# Patient Record
Sex: FEMALE | URBAN - METROPOLITAN AREA
[De-identification: names, ages, dates, MRNs, and addresses within clinical notes are randomized per-mention and may not be internally consistent; named-entity substitution may affect disease eponyms.]

---

## 2020-07-09 ENCOUNTER — APPOINTMENT (RX ONLY)
Dept: URBAN - METROPOLITAN AREA CLINIC 36 | Facility: CLINIC | Age: 55
Setting detail: DERMATOLOGY
End: 2020-07-09

## 2020-07-09 DIAGNOSIS — Z41.9 ENCOUNTER FOR PROCEDURE FOR PURPOSES OTHER THAN REMEDYING HEALTH STATE, UNSPECIFIED: ICD-10-CM

## 2020-07-09 PROCEDURE — ? JUVEDERM ULTRA PLUS XC INJECTION

## 2020-07-09 PROCEDURE — ? BOTOX

## 2020-07-09 NOTE — PROCEDURE: BOTOX
Depressor Anguli Oris Units: 6
Show Additional Area 5: Yes
Right Periorbital Skin Units: 0
Price (Use Numbers Only, No Special Characters Or $): 0.00
Dilution (U/0.1 Cc): 0.6
Forehead Units: 10
Show Right And Left Brow Units: No
Additional Area 1 Location: upper lip
Consent: Written consent obtained. Risks include but not limited to lid/brow ptosis, bruising, swelling, diplopia, temporary effect, incomplete chemical denervation.
Glabellar Complex Units: 24
Additional Area 3 Location: masseter
Additional Area 1 Units: 4
Detail Level: Detailed
Post-Care Instructions: Patient instructed to not lie down for 4 hours and limit physical activity for 24 hours. Patient instructed not to travel by airplane for 48 hours.
Expiration Date (Month Year): 08/22
Lot #: [id number]
Additional Area 2 Location: chin

## 2020-07-09 NOTE — PROCEDURE: JUVEDERM ULTRA PLUS XC INJECTION
Procedural Text: The filler was administered to the treatment areas noted above.
Filler: Juvederm Ultra Plus XC
Tear Troughs Filler Volume In Cc: 0
Lot #: [id number]
Map Statment: See Attach Map for Complete Details
Include Cannula Information In Note?: No
Additional Anesthesia Volume In Cc: 6
Consent: Written consent obtained. Risks include but not limited to bruising, beading, irregular texture, ulceration, infection, allergic reaction, scar formation, incomplete augmentation, temporary nature, procedural pain.
Detail Level: Detailed
Number Of Syringes (Required For Inventory): 1
Expiration Date (Month Year): 08/21/2020
Post-Care Instructions: Patient instructed to apply ice to reduce swelling.
Anesthesia Type: 1% lidocaine with epinephrine

## 2020-09-09 ENCOUNTER — APPOINTMENT (RX ONLY)
Dept: URBAN - METROPOLITAN AREA CLINIC 20 | Facility: CLINIC | Age: 55
Setting detail: DERMATOLOGY
End: 2020-09-09

## 2020-09-09 DIAGNOSIS — Z71.89 OTHER SPECIFIED COUNSELING: ICD-10-CM

## 2020-09-09 DIAGNOSIS — D49.2 NEOPLASM OF UNSPECIFIED BEHAVIOR OF BONE, SOFT TISSUE, AND SKIN: ICD-10-CM

## 2020-09-09 DIAGNOSIS — D18.0 HEMANGIOMA: ICD-10-CM

## 2020-09-09 DIAGNOSIS — D22 MELANOCYTIC NEVI: ICD-10-CM

## 2020-09-09 DIAGNOSIS — L81.4 OTHER MELANIN HYPERPIGMENTATION: ICD-10-CM

## 2020-09-09 DIAGNOSIS — L82.1 OTHER SEBORRHEIC KERATOSIS: ICD-10-CM

## 2020-09-09 PROBLEM — D22.5 MELANOCYTIC NEVI OF TRUNK: Status: ACTIVE | Noted: 2020-09-09

## 2020-09-09 PROBLEM — D22.61 MELANOCYTIC NEVI OF RIGHT UPPER LIMB, INCLUDING SHOULDER: Status: ACTIVE | Noted: 2020-09-09

## 2020-09-09 PROBLEM — D22.72 MELANOCYTIC NEVI OF LEFT LOWER LIMB, INCLUDING HIP: Status: ACTIVE | Noted: 2020-09-09

## 2020-09-09 PROBLEM — D18.01 HEMANGIOMA OF SKIN AND SUBCUTANEOUS TISSUE: Status: ACTIVE | Noted: 2020-09-09

## 2020-09-09 PROBLEM — D22.62 MELANOCYTIC NEVI OF LEFT UPPER LIMB, INCLUDING SHOULDER: Status: ACTIVE | Noted: 2020-09-09

## 2020-09-09 PROBLEM — D22.71 MELANOCYTIC NEVI OF RIGHT LOWER LIMB, INCLUDING HIP: Status: ACTIVE | Noted: 2020-09-09

## 2020-09-09 PROCEDURE — ? DEFER

## 2020-09-09 PROCEDURE — ? OBSERVATION AND MEASURE

## 2020-09-09 PROCEDURE — 99203 OFFICE O/P NEW LOW 30 MIN: CPT

## 2020-09-09 PROCEDURE — ? ADDITIONAL NOTES

## 2020-09-09 PROCEDURE — ? COUNSELING

## 2020-09-09 ASSESSMENT — LOCATION DETAILED DESCRIPTION DERM
LOCATION DETAILED: RIGHT ANTECUBITAL SKIN
LOCATION DETAILED: RIGHT MEDIAL DORSAL FOOT
LOCATION DETAILED: LEFT ANTERIOR PROXIMAL UPPER ARM
LOCATION DETAILED: LEFT PROXIMAL PRETIBIAL REGION
LOCATION DETAILED: RIGHT KNEE
LOCATION DETAILED: RIGHT ANTERIOR DISTAL UPPER ARM
LOCATION DETAILED: EPIGASTRIC SKIN
LOCATION DETAILED: LEFT ANTERIOR DISTAL THIGH
LOCATION DETAILED: RIGHT VENTRAL PROXIMAL FOREARM
LOCATION DETAILED: LEFT KNEE
LOCATION DETAILED: RIGHT ANTERIOR DISTAL THIGH
LOCATION DETAILED: RIGHT ANTERIOR PROXIMAL UPPER ARM
LOCATION DETAILED: LEFT VENTRAL PROXIMAL FOREARM
LOCATION DETAILED: PERIUMBILICAL SKIN
LOCATION DETAILED: RIGHT PROXIMAL PRETIBIAL REGION
LOCATION DETAILED: LOWER STERNUM
LOCATION DETAILED: LEFT ANTERIOR DISTAL UPPER ARM

## 2020-09-09 ASSESSMENT — LOCATION SIMPLE DESCRIPTION DERM
LOCATION SIMPLE: ABDOMEN
LOCATION SIMPLE: RIGHT KNEE
LOCATION SIMPLE: LEFT UPPER ARM
LOCATION SIMPLE: RIGHT UPPER ARM
LOCATION SIMPLE: LEFT PRETIBIAL REGION
LOCATION SIMPLE: CHEST
LOCATION SIMPLE: RIGHT FOREARM
LOCATION SIMPLE: LEFT FOREARM
LOCATION SIMPLE: LEFT KNEE
LOCATION SIMPLE: LEFT THIGH
LOCATION SIMPLE: RIGHT FOOT
LOCATION SIMPLE: RIGHT PRETIBIAL REGION
LOCATION SIMPLE: RIGHT THIGH

## 2020-09-09 ASSESSMENT — LOCATION ZONE DERM
LOCATION ZONE: LEG
LOCATION ZONE: ARM
LOCATION ZONE: FEET
LOCATION ZONE: TRUNK

## 2020-09-09 NOTE — PROCEDURE: ADDITIONAL NOTES
Additional Notes: Favor corn/callus\\nPt denies any rubbing to area. Does not have characteristic features of a wart. \\nDiscussed paring down vs biopsy.\\nPatient would like to wait and observe area\\nPhoto taken
Detail Level: Simple

## 2023-05-10 ENCOUNTER — HOSPITAL ENCOUNTER (EMERGENCY)
Facility: MEDICAL CENTER | Age: 58
End: 2023-05-11
Attending: STUDENT IN AN ORGANIZED HEALTH CARE EDUCATION/TRAINING PROGRAM
Payer: COMMERCIAL

## 2023-05-10 ENCOUNTER — APPOINTMENT (OUTPATIENT)
Dept: RADIOLOGY | Facility: MEDICAL CENTER | Age: 58
End: 2023-05-10
Payer: COMMERCIAL

## 2023-05-10 ENCOUNTER — APPOINTMENT (OUTPATIENT)
Dept: RADIOLOGY | Facility: MEDICAL CENTER | Age: 58
End: 2023-05-10
Attending: STUDENT IN AN ORGANIZED HEALTH CARE EDUCATION/TRAINING PROGRAM
Payer: COMMERCIAL

## 2023-05-10 VITALS
SYSTOLIC BLOOD PRESSURE: 110 MMHG | DIASTOLIC BLOOD PRESSURE: 65 MMHG | OXYGEN SATURATION: 96 % | HEIGHT: 61 IN | WEIGHT: 109.35 LBS | BODY MASS INDEX: 20.65 KG/M2 | RESPIRATION RATE: 18 BRPM | TEMPERATURE: 99.3 F | HEART RATE: 73 BPM

## 2023-05-10 DIAGNOSIS — K57.92 ACUTE DIVERTICULITIS: ICD-10-CM

## 2023-05-10 DIAGNOSIS — R10.32 LEFT LOWER QUADRANT ABDOMINAL PAIN: ICD-10-CM

## 2023-05-10 LAB
ALBUMIN SERPL BCP-MCNC: 4.4 G/DL (ref 3.2–4.9)
ALBUMIN/GLOB SERPL: 1.2 G/DL
ALP SERPL-CCNC: 73 U/L (ref 30–99)
ALT SERPL-CCNC: 18 U/L (ref 2–50)
ANION GAP SERPL CALC-SCNC: 12 MMOL/L (ref 7–16)
AST SERPL-CCNC: 20 U/L (ref 12–45)
BASOPHILS # BLD AUTO: 0.2 % (ref 0–1.8)
BASOPHILS # BLD: 0.03 K/UL (ref 0–0.12)
BILIRUB SERPL-MCNC: 0.5 MG/DL (ref 0.1–1.5)
BUN SERPL-MCNC: 14 MG/DL (ref 8–22)
CALCIUM ALBUM COR SERPL-MCNC: 9.3 MG/DL (ref 8.5–10.5)
CALCIUM SERPL-MCNC: 9.6 MG/DL (ref 8.5–10.5)
CHLORIDE SERPL-SCNC: 102 MMOL/L (ref 96–112)
CO2 SERPL-SCNC: 24 MMOL/L (ref 20–33)
CREAT SERPL-MCNC: 0.79 MG/DL (ref 0.5–1.4)
EOSINOPHIL # BLD AUTO: 0.03 K/UL (ref 0–0.51)
EOSINOPHIL NFR BLD: 0.2 % (ref 0–6.9)
ERYTHROCYTE [DISTWIDTH] IN BLOOD BY AUTOMATED COUNT: 47.8 FL (ref 35.9–50)
GFR SERPLBLD CREATININE-BSD FMLA CKD-EPI: 88 ML/MIN/1.73 M 2
GLOBULIN SER CALC-MCNC: 3.7 G/DL (ref 1.9–3.5)
GLUCOSE SERPL-MCNC: 141 MG/DL (ref 65–99)
HCT VFR BLD AUTO: 39.7 % (ref 37–47)
HGB BLD-MCNC: 13.3 G/DL (ref 12–16)
IMM GRANULOCYTES # BLD AUTO: 0.07 K/UL (ref 0–0.11)
IMM GRANULOCYTES NFR BLD AUTO: 0.6 % (ref 0–0.9)
LACTATE SERPL-SCNC: 0.8 MMOL/L (ref 0.5–2)
LYMPHOCYTES # BLD AUTO: 1.27 K/UL (ref 1–4.8)
LYMPHOCYTES NFR BLD: 10 % (ref 22–41)
MCH RBC QN AUTO: 31.5 PG (ref 27–33)
MCHC RBC AUTO-ENTMCNC: 33.5 G/DL (ref 33.6–35)
MCV RBC AUTO: 94.1 FL (ref 81.4–97.8)
MONOCYTES # BLD AUTO: 0.82 K/UL (ref 0–0.85)
MONOCYTES NFR BLD AUTO: 6.5 % (ref 0–13.4)
NEUTROPHILS # BLD AUTO: 10.45 K/UL (ref 2–7.15)
NEUTROPHILS NFR BLD: 82.5 % (ref 44–72)
NRBC # BLD AUTO: 0 K/UL
NRBC BLD-RTO: 0 /100 WBC
PLATELET # BLD AUTO: 217 K/UL (ref 164–446)
PMV BLD AUTO: 11.1 FL (ref 9–12.9)
POTASSIUM SERPL-SCNC: 3.7 MMOL/L (ref 3.6–5.5)
PROT SERPL-MCNC: 8.1 G/DL (ref 6–8.2)
RBC # BLD AUTO: 4.22 M/UL (ref 4.2–5.4)
SODIUM SERPL-SCNC: 138 MMOL/L (ref 135–145)
WBC # BLD AUTO: 12.7 K/UL (ref 4.8–10.8)

## 2023-05-10 PROCEDURE — 700117 HCHG RX CONTRAST REV CODE 255: Performed by: STUDENT IN AN ORGANIZED HEALTH CARE EDUCATION/TRAINING PROGRAM

## 2023-05-10 PROCEDURE — 81001 URINALYSIS AUTO W/SCOPE: CPT

## 2023-05-10 PROCEDURE — 87186 SC STD MICRODIL/AGAR DIL: CPT

## 2023-05-10 PROCEDURE — 80053 COMPREHEN METABOLIC PANEL: CPT

## 2023-05-10 PROCEDURE — A9270 NON-COVERED ITEM OR SERVICE: HCPCS

## 2023-05-10 PROCEDURE — 87086 URINE CULTURE/COLONY COUNT: CPT

## 2023-05-10 PROCEDURE — 87040 BLOOD CULTURE FOR BACTERIA: CPT | Mod: 91

## 2023-05-10 PROCEDURE — 87077 CULTURE AEROBIC IDENTIFY: CPT

## 2023-05-10 PROCEDURE — 74177 CT ABD & PELVIS W/CONTRAST: CPT

## 2023-05-10 PROCEDURE — 99284 EMERGENCY DEPT VISIT MOD MDM: CPT

## 2023-05-10 PROCEDURE — 83605 ASSAY OF LACTIC ACID: CPT

## 2023-05-10 PROCEDURE — 700102 HCHG RX REV CODE 250 W/ 637 OVERRIDE(OP): Performed by: STUDENT IN AN ORGANIZED HEALTH CARE EDUCATION/TRAINING PROGRAM

## 2023-05-10 PROCEDURE — 700102 HCHG RX REV CODE 250 W/ 637 OVERRIDE(OP)

## 2023-05-10 PROCEDURE — 700105 HCHG RX REV CODE 258: Performed by: STUDENT IN AN ORGANIZED HEALTH CARE EDUCATION/TRAINING PROGRAM

## 2023-05-10 PROCEDURE — 85025 COMPLETE CBC W/AUTO DIFF WBC: CPT

## 2023-05-10 PROCEDURE — 36415 COLL VENOUS BLD VENIPUNCTURE: CPT

## 2023-05-10 PROCEDURE — A9270 NON-COVERED ITEM OR SERVICE: HCPCS | Performed by: STUDENT IN AN ORGANIZED HEALTH CARE EDUCATION/TRAINING PROGRAM

## 2023-05-10 RX ORDER — METRONIDAZOLE 500 MG/1
500 TABLET ORAL ONCE
Status: COMPLETED | OUTPATIENT
Start: 2023-05-11 | End: 2023-05-10

## 2023-05-10 RX ORDER — CIPROFLOXACIN 500 MG/1
500 TABLET, FILM COATED ORAL 2 TIMES DAILY
Qty: 14 TABLET | Refills: 0 | Status: ACTIVE | OUTPATIENT
Start: 2023-05-10 | End: 2023-05-17

## 2023-05-10 RX ORDER — ACETAMINOPHEN 325 MG/1
650 TABLET ORAL ONCE
Status: COMPLETED | OUTPATIENT
Start: 2023-05-10 | End: 2023-05-10

## 2023-05-10 RX ORDER — CIPROFLOXACIN 500 MG/1
500 TABLET, FILM COATED ORAL ONCE
Status: COMPLETED | OUTPATIENT
Start: 2023-05-11 | End: 2023-05-10

## 2023-05-10 RX ORDER — SODIUM CHLORIDE, SODIUM LACTATE, POTASSIUM CHLORIDE, AND CALCIUM CHLORIDE .6; .31; .03; .02 G/100ML; G/100ML; G/100ML; G/100ML
30 INJECTION, SOLUTION INTRAVENOUS ONCE
Status: COMPLETED | OUTPATIENT
Start: 2023-05-10 | End: 2023-05-10

## 2023-05-10 RX ORDER — METRONIDAZOLE 500 MG/1
500 TABLET ORAL 3 TIMES DAILY
Qty: 21 TABLET | Refills: 0 | Status: ACTIVE | OUTPATIENT
Start: 2023-05-10 | End: 2023-05-17

## 2023-05-10 RX ADMIN — METRONIDAZOLE 500 MG: 500 TABLET ORAL at 23:50

## 2023-05-10 RX ADMIN — IOHEXOL 80 ML: 350 INJECTION, SOLUTION INTRAVENOUS at 23:00

## 2023-05-10 RX ADMIN — ACETAMINOPHEN 650 MG: 325 TABLET, FILM COATED ORAL at 21:08

## 2023-05-10 RX ADMIN — CIPROFLOXACIN 500 MG: 500 TABLET, FILM COATED ORAL at 23:50

## 2023-05-10 RX ADMIN — SODIUM CHLORIDE, POTASSIUM CHLORIDE, SODIUM LACTATE AND CALCIUM CHLORIDE 1488 ML: 600; 310; 30; 20 INJECTION, SOLUTION INTRAVENOUS at 22:24

## 2023-05-10 ASSESSMENT — PAIN DESCRIPTION - DESCRIPTORS: DESCRIPTORS: SHARP

## 2023-05-10 ASSESSMENT — PAIN DESCRIPTION - PAIN TYPE
TYPE: ACUTE PAIN
TYPE: ACUTE PAIN

## 2023-05-11 NOTE — DISCHARGE INSTRUCTIONS
Please return to the ER immediately if you have persistent worsening pain, fever, vomiting, inability to eat or drink, blood or black/tarry stools or any new or acute concern.     Please call your PCP to schedule follow-up within one week to ensure your symptoms are improving.

## 2023-05-11 NOTE — ED TRIAGE NOTES
"Chief Complaint   Patient presents with    Abdominal Pain     Started Sunday night, pt reports fever last night of 102F and it broke last night, and reports this morning started having fever again. Pt reports taking tylenol at home that helped with the fever. Abdominal pain is more diffuse, pt states it is painful to have a bowel movement when bearing down. Denies N/V, pt is able to keep food down. Denies diarrhea. +rebound tenderness. Reports pain is sharp in nature.     Fever       57F to triage for above complaint. Pt appears very flushed in triage, +fever in triage. Denies dysuria. GCS 15.     Pt is alert and oriented, speaking in full sentences, follows commands and responds appropriately to questions. NAD. Resp are even and unlabored.      Pt placed in lobby. Pt educated on triage process. Pt encouraged to alert staff for any changes.     Patient and staff wearing appropriate PPE    BP (!) 117/90   Pulse 90   Temp 37.9 °C (100.3 °F) (Oral)   Resp 18   Ht 1.549 m (5' 1\")   Wt 49.6 kg (109 lb 5.6 oz)   SpO2 96% Comment: [place] ir  BMI 20.66 kg/m²      "

## 2023-05-11 NOTE — ED NOTES
Discharge paper given to patient; verbalized understanding on d/c instructions; left ambulatory with spouse

## 2023-05-11 NOTE — ED NOTES
Patient ambulatory to [facility]  for triage complaint. Pt placed in gown and placed on monitor.

## 2023-05-11 NOTE — ED PROVIDER NOTES
ED Provider Note    CHIEF COMPLAINT  Chief Complaint   Patient presents with    Abdominal Pain     Started Sunday night, pt reports fever last night of 102F and it broke last night, and reports this morning started having fever again. Pt reports taking tylenol at home that helped with the fever. Abdominal pain is more diffuse, pt states it is painful to have a bowel movement when bearing down. Denies N/V, pt is able to keep food down. Denies diarrhea. +rebound tenderness. Reports pain is sharp in nature.     Fever       EXTERNAL RECORDS REVIEWED  None    HPI/ROS  LIMITATION TO HISTORY   Select: : None  OUTSIDE HISTORIAN(S):  None    Sally Miranda is a 57 y.o. female who presents with fever and abdominal pain.  She says her fever started on Sunday. The fever did break yesterday and she reported feeling somewhat improved however then developed a fever again this evening.  She said that she has been having some associated abdominal pain which she says is mostly throughout her lower abdomen she thinks is most notable on the left side.  She does have pain associated with the pressure of having a bowel movement . She also has pain when she moves around but has not had any associated diarrhea, constipation, nausea or vomiting.  She has been eating and drinking without difficulty.  She describes the pain as sharp. No dysuria, hematuria or urgency.     She has been taking Tylenol at home with some relief.    PAST MEDICAL HISTORY   Denies    SURGICAL HISTORY  patient denies any surgical history    FAMILY HISTORY  History reviewed. No pertinent family history.    SOCIAL HISTORY  Social History     Tobacco Use    Smoking status: Never    Smokeless tobacco: Never   Vaping Use    Vaping Use: Not on file   Substance and Sexual Activity    Alcohol use: Yes     Alcohol/week: 0.6 oz     Types: 1 Glasses of wine per week    Drug use: Not Currently    Sexual activity: Not on file       CURRENT MEDICATIONS  Home Medications        "Reviewed by Marli Perez R.N. (Registered Nurse) on 05/10/23 at 2102  Med List Status: Partial     Medication Last Dose Status        Patient Bharat Taking any Medications                           ALLERGIES  No Known Allergies    PHYSICAL EXAM  VITAL SIGNS: /65   Pulse 73   Temp 37.4 °C (99.3 °F)   Resp 18   Ht 1.549 m (5' 1\")   Wt 49.6 kg (109 lb 5.6 oz)   SpO2 96%   BMI 20.66 kg/m²    Constitutional: Awake and alert . Non toxic  HENT: Normal inspection  . Dry mucous membranes  Eyes: Normal inspection  Neck: Grossly normal range of motion.  Cardiovascular: Normal heart rate, Normal rhythm.    Thorax & Lungs: No respiratory distress, No wheezing, No rales, No rhonchi  Abdomen: Soft, non-distended, tender to palpation in LLQ , no mass  Skin: No obvious rash.  Extremities: Warm, well perfused. No clubbing, cyanosis, edema   Neurologic: Grossly normal   Psychiatric: Normal for situation      DIAGNOSTIC STUDIES / PROCEDURES    LABS  Results for orders placed or performed during the hospital encounter of 05/10/23   Lactic acid (lactate)   Result Value Ref Range    Lactic Acid 0.8 0.5 - 2.0 mmol/L   CBC With Differential   Result Value Ref Range    WBC 12.7 (H) 4.8 - 10.8 K/uL    RBC 4.22 4.20 - 5.40 M/uL    Hemoglobin 13.3 12.0 - 16.0 g/dL    Hematocrit 39.7 37.0 - 47.0 %    MCV 94.1 81.4 - 97.8 fL    MCH 31.5 27.0 - 33.0 pg    MCHC 33.5 (L) 33.6 - 35.0 g/dL    RDW 47.8 35.9 - 50.0 fL    Platelet Count 217 164 - 446 K/uL    MPV 11.1 9.0 - 12.9 fL    Neutrophils-Polys 82.50 (H) 44.00 - 72.00 %    Lymphocytes 10.00 (L) 22.00 - 41.00 %    Monocytes 6.50 0.00 - 13.40 %    Eosinophils 0.20 0.00 - 6.90 %    Basophils 0.20 0.00 - 1.80 %    Immature Granulocytes 0.60 0.00 - 0.90 %    Nucleated RBC 0.00 /100 WBC    Neutrophils (Absolute) 10.45 (H) 2.00 - 7.15 K/uL    Lymphs (Absolute) 1.27 1.00 - 4.80 K/uL    Monos (Absolute) 0.82 0.00 - 0.85 K/uL    Eos (Absolute) 0.03 0.00 - 0.51 K/uL    Baso (Absolute) " 0.03 0.00 - 0.12 K/uL    Immature Granulocytes (abs) 0.07 0.00 - 0.11 K/uL    NRBC (Absolute) 0.00 K/uL   Comp Metabolic Panel   Result Value Ref Range    Sodium 138 135 - 145 mmol/L    Potassium 3.7 3.6 - 5.5 mmol/L    Chloride 102 96 - 112 mmol/L    Co2 24 20 - 33 mmol/L    Anion Gap 12.0 7.0 - 16.0    Glucose 141 (H) 65 - 99 mg/dL    Bun 14 8 - 22 mg/dL    Creatinine 0.79 0.50 - 1.40 mg/dL    Calcium 9.6 8.5 - 10.5 mg/dL    AST(SGOT) 20 12 - 45 U/L    ALT(SGPT) 18 2 - 50 U/L    Alkaline Phosphatase 73 30 - 99 U/L    Total Bilirubin 0.5 0.1 - 1.5 mg/dL    Albumin 4.4 3.2 - 4.9 g/dL    Total Protein 8.1 6.0 - 8.2 g/dL    Globulin 3.7 (H) 1.9 - 3.5 g/dL    A-G Ratio 1.2 g/dL   Blood Culture    Specimen: Peripheral; Blood   Result Value Ref Range    Significant Indicator NEG     Source BLD     Site PERIPHERAL     Culture Result       No Growth  Note: Blood cultures are incubated for 5 days and  are monitored continuously.Positive blood cultures  are called to the RN and reported as soon as  they are identified.     Blood Culture    Specimen: Peripheral; Blood   Result Value Ref Range    Significant Indicator NEG     Source BLD     Site PERIPHERAL     Culture Result       No Growth  Note: Blood cultures are incubated for 5 days and  are monitored continuously.Positive blood cultures  are called to the RN and reported as soon as  they are identified.     CORRECTED CALCIUM   Result Value Ref Range    Correct Calcium 9.3 8.5 - 10.5 mg/dL   ESTIMATED GFR   Result Value Ref Range    GFR (CKD-EPI) 88 >60 mL/min/1.73 m 2         RADIOLOGY  I have independently interpreted the diagnostic imaging associated with this visit and am waiting the final reading from the radiologist.   My preliminary interpretation is as follows: CT no obvious bowel obstruction  Radiologist interpretation:   CT-ABDOMEN-PELVIS WITH   Final Result         1.  Diverticulosis. Segmental area of sigmoid colonic wall thickening with adjacent hazy fat  stranding is seen, appearance most typical of diverticulitis. Colonoscopy or CT follow-up following treatment to exclude inflammatory carcinoma which can have    radiographically similar appearance.   2.  Prominent vascular structures along the left margin of the uterus, appearance favoring pelvic congestion syndrome            COURSE & MEDICAL DECISION MAKING    ED Observation Status? Yes; I am placing the patient in to an observation status due to a diagnostic uncertainty as well as therapeutic intensity. Patient placed in observation status at 22.15 PM, 5/11/2023.     Observation plan is as follows: IV, labs, CT, Serial exams    Upon Reevaluation, the patient's condition has: Improved; and will be discharged.    Patient discharged from ED Observation status at 23.35 AM 5/11/2023    INITIAL ASSESSMENT, COURSE AND PLAN  Care Narrative: This is a 57-year-old with no chronic medical problems who presents with abdominal pain and fever.  On arrival she is febrile but is not tachycardic, hypotensive and is overall systemically well-appearing.  She is tender to palpation on the left lower quadrant without signs of peritonitis.  Differential includes diverticulitis, colitis, appendicitis, small bowel obstruction, kidney pathology, ovarian torsion among others.  Labs are notable for leukocytosis of 12,000, normal renal function testing, normal liver function testing.  Lactic acid is normal.  She has no urinary symptoms and doubt UTI or kidney infection. Patient given Tylenol and IV fluids.   CT scan obtained with evidence of likely acute diverticulitis with sigmoid colonic wall thickening and associated diverticulosis.  I did notify the patient that she will need a follow-up colonoscopy  to exclude inflammatory carcinoma which can have a radiographically similar appearance. She states she is already scheduled for one in June.  She was also incidentally noted to have prominent vasculature along the left margin of the uterus  favoring pelvic congestion syndrome. There is no evidence of complication on CT such as abscess or perforation.     Patient was reevaluated, her vitals have improved and she continues to look overall systemically well.  With shared decision making she prefers to discharge home and manage as an outpatient.  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  She was given first dose of ciprofloxacin and Flagyl here in the ER which she tolerated well. She declined additional pain medications.  I advised that she continue with bowel rest and advance diet as tolerated.  She expresses understanding and will return to the ER for persistent or worsening symptoms.     DISPOSITION AND DISCUSSIONS  I have discussed management of the patient with the following physicians and VERA's:  None    Discussion of management with other QHP or appropriate source(s): None     Escalation of care considered, and ultimately not performed:after discussion with the patient / family, they have elected to decline an escalation in care    Barriers to care at this time, including but not limited to:  None .     Decision tools and prescription drugs considered including, but not limited to: Antibiotics Prescribed, first dose given here .    FINAL DIAGNOSIS  1. Acute diverticulitis Acute   2. Left lower quadrant abdominal pain Acute          Electronically signed by: Milvia Muñiz M.D., 5/10/2023 10:04 PM

## 2023-05-12 LAB
APPEARANCE UR: CLEAR
BACTERIA #/AREA URNS HPF: NEGATIVE /HPF
BILIRUB UR QL STRIP.AUTO: NEGATIVE
COLOR UR: YELLOW
EPI CELLS #/AREA URNS HPF: NEGATIVE /HPF
GLUCOSE UR STRIP.AUTO-MCNC: NEGATIVE MG/DL
HYALINE CASTS #/AREA URNS LPF: NORMAL /LPF
KETONES UR STRIP.AUTO-MCNC: NEGATIVE MG/DL
LEUKOCYTE ESTERASE UR QL STRIP.AUTO: NEGATIVE
MICRO URNS: ABNORMAL
NITRITE UR QL STRIP.AUTO: NEGATIVE
PH UR STRIP.AUTO: 6.5 [PH] (ref 5–8)
PROT UR QL STRIP: NEGATIVE MG/DL
RBC # URNS HPF: NORMAL /HPF
RBC UR QL AUTO: ABNORMAL
SP GR UR STRIP.AUTO: 1.01
UROBILINOGEN UR STRIP.AUTO-MCNC: 1 MG/DL
WBC #/AREA URNS HPF: NORMAL /HPF

## 2023-05-13 LAB
BACTERIA UR CULT: ABNORMAL
BACTERIA UR CULT: ABNORMAL
SIGNIFICANT IND 70042: ABNORMAL
SITE SITE: ABNORMAL
SOURCE SOURCE: ABNORMAL

## 2023-05-15 LAB
BACTERIA BLD CULT: NORMAL
BACTERIA BLD CULT: NORMAL
SIGNIFICANT IND 70042: NORMAL
SIGNIFICANT IND 70042: NORMAL
SITE SITE: NORMAL
SITE SITE: NORMAL
SOURCE SOURCE: NORMAL
SOURCE SOURCE: NORMAL

## 2023-05-16 ENCOUNTER — OFFICE VISIT (OUTPATIENT)
Dept: MEDICAL GROUP | Facility: MEDICAL CENTER | Age: 58
End: 2023-05-16
Payer: COMMERCIAL

## 2023-05-16 ENCOUNTER — HOSPITAL ENCOUNTER (OUTPATIENT)
Dept: LAB | Facility: MEDICAL CENTER | Age: 58
End: 2023-05-16
Attending: STUDENT IN AN ORGANIZED HEALTH CARE EDUCATION/TRAINING PROGRAM
Payer: COMMERCIAL

## 2023-05-16 VITALS
BODY MASS INDEX: 21.26 KG/M2 | TEMPERATURE: 97.5 F | OXYGEN SATURATION: 99 % | SYSTOLIC BLOOD PRESSURE: 104 MMHG | HEIGHT: 61 IN | DIASTOLIC BLOOD PRESSURE: 62 MMHG | HEART RATE: 65 BPM | WEIGHT: 112.6 LBS

## 2023-05-16 DIAGNOSIS — Z13.1 SCREENING FOR DIABETES MELLITUS: ICD-10-CM

## 2023-05-16 DIAGNOSIS — Z11.59 ENCOUNTER FOR HEPATITIS C SCREENING TEST FOR LOW RISK PATIENT: ICD-10-CM

## 2023-05-16 DIAGNOSIS — E03.8 SUBCLINICAL HYPOTHYROIDISM: ICD-10-CM

## 2023-05-16 DIAGNOSIS — K57.92 DIVERTICULITIS: ICD-10-CM

## 2023-05-16 DIAGNOSIS — K76.0 NONALCOHOLIC HEPATOSTEATOSIS: ICD-10-CM

## 2023-05-16 PROBLEM — D25.0 FIBROIDS, SUBMUCOSAL: Status: ACTIVE | Noted: 2019-07-07

## 2023-05-16 PROBLEM — R15.0 INCOMPLETE DEFECATION: Status: ACTIVE | Noted: 2021-04-29

## 2023-05-16 PROBLEM — T85.43XA RUPTURE OF IMPLANT OF RIGHT BREAST: Status: ACTIVE | Noted: 2021-08-27

## 2023-05-16 PROBLEM — R10.12 LEFT UPPER QUADRANT ABDOMINAL PAIN: Status: ACTIVE | Noted: 2021-04-29

## 2023-05-16 PROBLEM — R14.0 BLOATING SYMPTOM: Status: ACTIVE | Noted: 2021-04-29

## 2023-05-16 LAB
CHOLEST SERPL-MCNC: 161 MG/DL (ref 100–199)
EST. AVERAGE GLUCOSE BLD GHB EST-MCNC: 105 MG/DL
FASTING STATUS PATIENT QL REPORTED: NORMAL
HBA1C MFR BLD: 5.3 % (ref 4–5.6)
HCV AB SER QL: NORMAL
HDLC SERPL-MCNC: 61 MG/DL
LDLC SERPL CALC-MCNC: 92 MG/DL
T4 FREE SERPL-MCNC: 1.09 NG/DL (ref 0.93–1.7)
TRIGL SERPL-MCNC: 42 MG/DL (ref 0–149)
TSH SERPL DL<=0.005 MIU/L-ACNC: 8.23 UIU/ML (ref 0.38–5.33)

## 2023-05-16 PROCEDURE — 84439 ASSAY OF FREE THYROXINE: CPT

## 2023-05-16 PROCEDURE — 3074F SYST BP LT 130 MM HG: CPT | Performed by: STUDENT IN AN ORGANIZED HEALTH CARE EDUCATION/TRAINING PROGRAM

## 2023-05-16 PROCEDURE — 80061 LIPID PANEL: CPT

## 2023-05-16 PROCEDURE — 83036 HEMOGLOBIN GLYCOSYLATED A1C: CPT

## 2023-05-16 PROCEDURE — 3078F DIAST BP <80 MM HG: CPT | Performed by: STUDENT IN AN ORGANIZED HEALTH CARE EDUCATION/TRAINING PROGRAM

## 2023-05-16 PROCEDURE — 99204 OFFICE O/P NEW MOD 45 MIN: CPT | Performed by: STUDENT IN AN ORGANIZED HEALTH CARE EDUCATION/TRAINING PROGRAM

## 2023-05-16 PROCEDURE — 86803 HEPATITIS C AB TEST: CPT

## 2023-05-16 PROCEDURE — 84443 ASSAY THYROID STIM HORMONE: CPT

## 2023-05-16 PROCEDURE — 36415 COLL VENOUS BLD VENIPUNCTURE: CPT

## 2023-05-16 ASSESSMENT — ENCOUNTER SYMPTOMS
WHEEZING: 0
NAUSEA: 0
CHILLS: 0
VOMITING: 0
PALPITATIONS: 0
FEVER: 0
DIZZINESS: 0
WEIGHT LOSS: 0
HEADACHES: 0
SHORTNESS OF BREATH: 0

## 2023-05-16 ASSESSMENT — FIBROSIS 4 INDEX: FIB4 SCORE: 1.19

## 2023-05-16 ASSESSMENT — PATIENT HEALTH QUESTIONNAIRE - PHQ9: CLINICAL INTERPRETATION OF PHQ2 SCORE: 0

## 2023-05-16 NOTE — PROGRESS NOTES
Subjective:     CC:  Diagnoses of Encounter for hepatitis C screening test for low risk patient, Subclinical hypothyroidism, Screening for diabetes mellitus, and Nonalcoholic hepatosteatosis were pertinent to this visit.    HISTORY OF THE PRESENT ILLNESS: Patient is a 57 y.o. female. This pleasant patient is here today to establish care and discuss     [facility] records reviewed pmh of splenic flexure syndrome, chronic constipation, abdominal bloating. Recommended to see motility specialist vs functional disorder.   Hypothyroidsm     5/10/23-5/11/2023 - admission for acute diverticulitis. Leukocytosis noted on lab, lactic acid wnl, CT abdomen suggestive of accute diverticulitis with sigmoid colonic wall thickening.   - follow up colonoscopy to exclude inflammmatory carcinoma ( colonoscopy rescheduled 7/05/2023)  - provmenet vascular in uterus..  - dc with metronidazole and ciprofloxacin x 7 days  - TODAY- feeling better, denies fever, chill, abodminal pain. She has mild bloating.     Previously on thyroid medication 1 year ago  Colonoscopy record     Problem   Subclinical Hypothyroidism   Nonalcoholic Hepatosteatosis   Rupture of Implant of Right Breast   Bloating Symptom   Left Upper Quadrant Abdominal Pain   Incomplete Defecation   Fibroids, Submucosal   Constipation By Outlet Dysfunction       Health Maintenance:     ROS:   Review of Systems   Constitutional:  Negative for chills, fever and weight loss.   HENT:  Negative for hearing loss.    Respiratory:  Negative for shortness of breath and wheezing.    Cardiovascular:  Negative for chest pain and palpitations.   Gastrointestinal:  Negative for nausea and vomiting.   Genitourinary:  Negative for frequency and urgency.   Skin:  Negative for rash.   Neurological:  Negative for dizziness and headaches.         Objective:       Exam: /62 (BP Location: Right arm, Patient Position: Sitting, BP Cuff Size: Adult)   Pulse 65   Temp 36.4 °C (97.5 °F)  "(Temporal)   Ht 1.549 m (5' 1\")   Wt 51.1 kg (112 lb 9.6 oz)   SpO2 99%  Body mass index is 21.28 kg/m².    Physical Exam  Constitutional:       Appearance: Normal appearance.   Cardiovascular:      Heart sounds: No murmur heard.  Pulmonary:      Effort: Pulmonary effort is normal.      Breath sounds: Normal breath sounds. No wheezing.   Abdominal:      General: There is no distension.      Palpations: Abdomen is soft.      Tenderness: There is no abdominal tenderness.   Musculoskeletal:      Cervical back: Normal range of motion and neck supple.   Neurological:      Mental Status: She is alert.         Labs: elevated glucose noted on prior labs, imaging reviewed    Assessment & Plan:   57 y.o. female with the following -    1. Diverticulitis  Acute, stable  Recently seen at ED, dx with diverticulitis mild prescribed metronidazole and ciprofloxacin x 7 days  Patient overall doing well, denies fever, chills, worsen abdominal pain   Plan  Continue abx  Colonoscopy scheduled 7/2023    2. Subclinical hypothyroidism  Chronic, stable  Report previously on low dose levolthyroxine stopped 1 year ago  Plan  repeat  - TSH WITH REFLEX TO FT4; Future  - Lipid Profile; Future    3. Nonalcoholic hepatosteatosis  History of     4. Encounter for hepatitis C screening test for low risk patient    - HEP C VIRUS ANTIBODY; Future    5. Screening for diabetes mellitus    - HEMOGLOBIN A1C; Future        Return if symptoms worsen or fail to improve.    Please note that this dictation was created using voice recognition software. I have made every reasonable attempt to correct obvious errors, but I expect that there are errors of grammar and possibly content that I did not discover before finalizing the note.  "

## 2023-05-22 DIAGNOSIS — E03.8 SUBCLINICAL HYPOTHYROIDISM: ICD-10-CM

## 2023-05-22 RX ORDER — LEVOTHYROXINE SODIUM 0.07 MG/1
TABLET ORAL
COMMUNITY
Start: 2023-05-22 | End: 2023-05-22 | Stop reason: SDUPTHER

## 2023-05-22 RX ORDER — LEVOTHYROXINE SODIUM 0.07 MG/1
75 TABLET ORAL
Qty: 90 TABLET | Refills: 3 | Status: SHIPPED | OUTPATIENT
Start: 2023-05-22

## 2023-07-24 DIAGNOSIS — A49.8 RECURRENT CLOSTRIDIOIDES DIFFICILE INFECTION: ICD-10-CM

## 2023-08-02 DIAGNOSIS — A49.8 RECURRENT CLOSTRIDIOIDES DIFFICILE INFECTION: ICD-10-CM

## 2024-05-31 ENCOUNTER — APPOINTMENT (OUTPATIENT)
Dept: MEDICAL GROUP | Facility: MEDICAL CENTER | Age: 59
End: 2024-05-31
Payer: COMMERCIAL

## 2024-05-31 VITALS
HEART RATE: 68 BPM | BODY MASS INDEX: 20.77 KG/M2 | OXYGEN SATURATION: 97 % | TEMPERATURE: 96.8 F | WEIGHT: 110 LBS | DIASTOLIC BLOOD PRESSURE: 70 MMHG | HEIGHT: 61 IN | SYSTOLIC BLOOD PRESSURE: 114 MMHG

## 2024-05-31 DIAGNOSIS — Z13.79 GENETIC TESTING: ICD-10-CM

## 2024-05-31 DIAGNOSIS — E03.8 SUBCLINICAL HYPOTHYROIDISM: ICD-10-CM

## 2024-05-31 PROBLEM — R74.8 ABNORMAL LEVELS OF OTHER SERUM ENZYMES: Status: ACTIVE | Noted: 2024-05-31

## 2024-05-31 PROBLEM — R93.89 ABNORMAL CT SCAN: Status: ACTIVE | Noted: 2024-05-31

## 2024-05-31 PROBLEM — K58.9 IRRITABLE BOWEL SYNDROME: Status: ACTIVE | Noted: 2024-05-31

## 2024-05-31 RX ORDER — ESTRADIOL 0.1 MG/G
CREAM VAGINAL
COMMUNITY
Start: 2024-03-21

## 2024-05-31 ASSESSMENT — ENCOUNTER SYMPTOMS
PALPITATIONS: 0
VOMITING: 0
SHORTNESS OF BREATH: 0
DIZZINESS: 0
WEIGHT LOSS: 0
FEVER: 0
HEADACHES: 0
NAUSEA: 0
CHILLS: 0
WHEEZING: 0

## 2024-05-31 ASSESSMENT — FIBROSIS 4 INDEX: FIB4 SCORE: 1.22

## 2024-05-31 ASSESSMENT — PATIENT HEALTH QUESTIONNAIRE - PHQ9: CLINICAL INTERPRETATION OF PHQ2 SCORE: 0

## 2024-05-31 NOTE — LETTER
[facility]  Med Fleming M.D.  [address] [facility] [address]  [place] [address]  Fax: [phone]   Authorization for Release/Disclosure of   Protected Health Information   Name: MARYANNE MARTINEZ : 1965 SSN: [id number]   Address: [address] [place] [address]  [place] [address] Phone:    [phone] (home) [phone] (work)   I authorize the entity listed below to release/disclose the PHI below to:   [facility]/Med Fleming M.D. and Med Fleming M.D.   Provider or Entity Name:  [facility]    Address   City, [facility], [facility]   Phone:      Fax:     Reason for request: continuity of care   Information to be released:    [ xx ] LAST COLONOSCOPY,  including any PATH REPORT and follow-up  [  ] LAST FIT/COLOGUARD RESULT [  ] LAST DEXA  [  ] LAST MAMMOGRAM  [  ] LAST PAP  [  ] LAST LABS [  ] RETINA EXAM REPORT  [  ] IMMUNIZATION RECORDS  [  ] Release all info      [  ] Check here and initial the line next to each item to release ALL health information INCLUDING  _____ Care and treatment for drug and / or alcohol abuse  _____ HIV testing, infection status, or AIDS  _____ Genetic Testing    DATES OF SERVICE OR TIME PERIOD TO BE DISCLOSED: _____________  I understand and acknowledge that:  * This Authorization may be revoked at any time by you in writing, except if your health information has already been used or disclosed.  * Your health information that will be used or disclosed as a result of you signing this authorization could be re-disclosed by the recipient. If this occurs, your re-disclosed health information may no longer be protected by State or Federal laws.  * You may refuse to sign this Authorization. Your refusal will not affect your ability to obtain treatment.  * This Authorization becomes effective upon signing and will  on (date) __________.      If no date is indicated, this Authorization will  one (1) year from the signature date.    Name: Maryanne Martinez  Signature: Date:    5/31/2024     PLEASE FAX REQUESTED RECORDS BACK TO: [phone]

## 2024-05-31 NOTE — PROGRESS NOTES
"Subjective:     CC:     HPI:   Sally presents today with    Since followed with [facility]- noted for divertic, abnormal ct scan: mild intrahepatic ductal dilation.     Subclinical hypothyroidism  Lipoma 4.5 cm noted few months ago, 4.5cm subcut soft moveable mass, circular. Report no associated symptoms, agreed to monitor clinically.   Concern about dementia, report mother had TBI/ dementia.   Discussed indication for screening for AD, stress with results, limited med therapy patient would like to proceed.         Problem   Irritable Bowel Syndrome   Abnormal Levels of Other Serum Enzymes   Abnormal CT Scan       Health Maintenance:   ROS:  Review of Systems   Constitutional:  Negative for chills, fever and weight loss.   HENT:  Negative for hearing loss.    Respiratory:  Negative for shortness of breath and wheezing.    Cardiovascular:  Negative for chest pain and palpitations.   Gastrointestinal:  Negative for nausea and vomiting.   Genitourinary:  Negative for frequency and urgency.   Skin:  Negative for rash.   Neurological:  Negative for dizziness and headaches.       Objective:     Exam:  /70 (BP Location: Left arm)   Pulse 68   Temp 36 °C (96.8 °F)   Ht 1.549 m (5' 1\")   Wt 49.9 kg (110 lb)   SpO2 97%   BMI 20.78 kg/m²  Body mass index is 20.78 kg/m².    Physical Exam   4.5 cm left lower back sub cutaneous mass, round, moveable.       Labs: tsh noted for subclinical hypothyroidism    Assessment & Plan:     58 y.o. female with the following -     1. Subclinical hypothyroidism  Chronic stable  Asymptomatic  Continue levothyroxine 75mcg daily  - TSH WITH REFLEX TO FT4; Future    2. Genetic testing  Counseled on utility of alzheimer screening  Will sent for misc testing apoe   Sent patient review paper on environmental and lifestyle risk   May consider genetic referral if patient found to be at higher risk   - MISCELLANEOUS LAB TEST ([facility]/Other); Future      Return if symptoms worsen or fail to " improve.    Please note that this dictation was created using voice recognition software. I have made every reasonable attempt to correct obvious errors, but I expect that there are errors of grammar and possibly content that I did not discover before finalizing the note.

## 2024-07-25 DIAGNOSIS — E03.8 SUBCLINICAL HYPOTHYROIDISM: ICD-10-CM

## 2024-07-26 RX ORDER — LEVOTHYROXINE SODIUM 0.07 MG/1
75 TABLET ORAL
Qty: 90 TABLET | Refills: 3 | Status: SHIPPED | OUTPATIENT
Start: 2024-07-26

## 2025-03-28 ENCOUNTER — HOSPITAL ENCOUNTER (OUTPATIENT)
Dept: RADIOLOGY | Facility: MEDICAL CENTER | Age: 60
End: 2025-03-28

## 2025-04-01 ENCOUNTER — HOSPITAL ENCOUNTER (OUTPATIENT)
Dept: RADIOLOGY | Facility: MEDICAL CENTER | Age: 60
End: 2025-04-01
Attending: OBSTETRICS & GYNECOLOGY
Payer: COMMERCIAL

## 2025-04-01 DIAGNOSIS — Z12.31 VISIT FOR SCREENING MAMMOGRAM: ICD-10-CM

## 2025-04-01 PROCEDURE — 77067 SCR MAMMO BI INCL CAD: CPT

## 2025-07-02 ENCOUNTER — OFFICE VISIT (OUTPATIENT)
Dept: MEDICAL GROUP | Facility: LAB | Age: 60
End: 2025-07-02
Payer: COMMERCIAL

## 2025-07-02 VITALS
DIASTOLIC BLOOD PRESSURE: 62 MMHG | BODY MASS INDEX: 21.3 KG/M2 | HEIGHT: 61 IN | OXYGEN SATURATION: 96 % | HEART RATE: 60 BPM | WEIGHT: 112.8 LBS | SYSTOLIC BLOOD PRESSURE: 116 MMHG | RESPIRATION RATE: 16 BRPM | TEMPERATURE: 97.5 F

## 2025-07-02 DIAGNOSIS — J01.00 ACUTE NON-RECURRENT MAXILLARY SINUSITIS: Primary | ICD-10-CM

## 2025-07-02 PROCEDURE — 99213 OFFICE O/P EST LOW 20 MIN: CPT | Performed by: PHYSICIAN ASSISTANT

## 2025-07-02 PROCEDURE — 3078F DIAST BP <80 MM HG: CPT | Performed by: PHYSICIAN ASSISTANT

## 2025-07-02 PROCEDURE — 3074F SYST BP LT 130 MM HG: CPT | Performed by: PHYSICIAN ASSISTANT

## 2025-07-02 NOTE — PROGRESS NOTES
"Chief Complaint   Patient presents with    Sinusitis     11 days        HPI: Patient is a 59 y.o. female complaining of 11 days of illness including: cough, sinus pressure, sinus drainage, sore throat.   Mucus is: thick, yellow  Similarly ill exposures: no.  Treatments tried: prev tried antihistamines  She  reports that she has never smoked. She has never used smokeless tobacco..     ROS:  All ROS negative except for pertinent positives listed above.        I reviewed the patient's medications, allergies and medical history:  Current Medications[1]  Patient has no known allergies.  Past Medical History[2]     EXAM:  /62 (BP Location: Right arm, Patient Position: Sitting, BP Cuff Size: Adult)   Pulse 60   Temp 36.4 °C (97.5 °F) (Temporal)   Resp 16   Ht 1.549 m (5' 1\")   Wt 51.2 kg (112 lb 12.8 oz)   SpO2 96%   General: Alert, no conversational dyspnea or audible wheeze, non-toxic appearance.  Eyes: PERRL, conjunctiva slightly injected, no eye discharge.  Ears: Normal pinnae,TM's air/fluid interface bilaterally.  Nares: Patent with thin mucus.  Sinuses: tender over maxillary / frontal sinuses.  Throat: Erythematous injection without exudate.   Neck: Supple, with no adenopathy.  Lungs: Clear to auscultation bilaterally, no wheeze, crackles or rhonchi.   Heart: Regular rate without murmur.  Skin: Warm and dry without rash.     ASSESSMENT:   1. Acute non-recurrent maxillary sinusitis          PLAN:  1. As symptoms have been worsening over the last week, will treat with antibiotics.  2. Twice daily use of nasal saline rinse or Neti-Pot.  3. OTC anti-pyretics and decongestants as needed.  4. Follow-up in office or urgent care for worsening symptoms, difficulty breathing, lack of expected recovery, or should new symptoms or problems arise.         [1]   Current Outpatient Medications   Medication Sig Dispense Refill    amoxicillin-clavulanate (AUGMENTIN) 875-125 MG Tab Take 1 Tablet by mouth 2 times a day for 5 " days. 10 Tablet 0    levothyroxine (SYNTHROID) 75 MCG Tab TAKE 1 TABLET BY MOUTH EVERY DAY IN THE MORNING ON AN EMPTY STOMACH 90 Tablet 3    estradiol (ESTRACE) 0.1 MG/GM vaginal cream 1 G INTRAVAGINALLY 2 TIMES A WEEK       No current facility-administered medications for this visit.   [2] No past medical history on file.